# Patient Record
Sex: MALE | Race: WHITE | Employment: PART TIME | URBAN - NONMETROPOLITAN AREA
[De-identification: names, ages, dates, MRNs, and addresses within clinical notes are randomized per-mention and may not be internally consistent; named-entity substitution may affect disease eponyms.]

---

## 2018-07-11 ENCOUNTER — APPOINTMENT (OUTPATIENT)
Dept: GENERAL RADIOLOGY | Age: 23
End: 2018-07-11
Payer: COMMERCIAL

## 2018-07-11 ENCOUNTER — HOSPITAL ENCOUNTER (EMERGENCY)
Age: 23
Discharge: HOME | End: 2018-07-12
Payer: COMMERCIAL

## 2018-07-11 VITALS
HEIGHT: 74 IN | DIASTOLIC BLOOD PRESSURE: 76 MMHG | OXYGEN SATURATION: 99 % | SYSTOLIC BLOOD PRESSURE: 114 MMHG | RESPIRATION RATE: 16 BRPM | BODY MASS INDEX: 26.95 KG/M2 | HEART RATE: 57 BPM | TEMPERATURE: 98.3 F | WEIGHT: 210 LBS

## 2018-07-11 DIAGNOSIS — S62.640B OPEN NONDISPLACED FRACTURE OF PROXIMAL PHALANX OF RIGHT INDEX FINGER, INITIAL ENCOUNTER: ICD-10-CM

## 2018-07-11 DIAGNOSIS — S61.210A LACERATION OF RIGHT INDEX FINGER WITHOUT FOREIGN BODY WITHOUT DAMAGE TO NAIL, INITIAL ENCOUNTER: Primary | ICD-10-CM

## 2018-07-11 PROCEDURE — 6370000000 HC RX 637 (ALT 250 FOR IP): Performed by: STUDENT IN AN ORGANIZED HEALTH CARE EDUCATION/TRAINING PROGRAM

## 2018-07-11 PROCEDURE — 12001 RPR S/N/AX/GEN/TRNK 2.5CM/<: CPT

## 2018-07-11 PROCEDURE — 99282 EMERGENCY DEPT VISIT SF MDM: CPT

## 2018-07-11 PROCEDURE — 73140 X-RAY EXAM OF FINGER(S): CPT

## 2018-07-11 RX ORDER — HYDROCODONE BITARTRATE AND ACETAMINOPHEN 5; 325 MG/1; MG/1
1 TABLET ORAL ONCE
Status: COMPLETED | OUTPATIENT
Start: 2018-07-11 | End: 2018-07-11

## 2018-07-11 RX ADMIN — HYDROCODONE BITARTRATE AND ACETAMINOPHEN 1 TABLET: 5; 325 TABLET ORAL at 23:19

## 2018-07-11 ASSESSMENT — PAIN SCALES - GENERAL
PAINLEVEL_OUTOF10: 7
PAINLEVEL_OUTOF10: 7

## 2018-07-12 PROCEDURE — 6370000000 HC RX 637 (ALT 250 FOR IP): Performed by: EMERGENCY MEDICINE

## 2018-07-12 RX ORDER — CEPHALEXIN 500 MG/1
500 CAPSULE ORAL 4 TIMES DAILY
Qty: 28 CAPSULE | Refills: 0 | Status: SHIPPED | OUTPATIENT
Start: 2018-07-12 | End: 2018-07-19

## 2018-07-12 RX ORDER — CEPHALEXIN 250 MG/1
500 CAPSULE ORAL ONCE
Status: COMPLETED | OUTPATIENT
Start: 2018-07-12 | End: 2018-07-12

## 2018-07-12 RX ORDER — CLINDAMYCIN PHOSPHATE 150 MG/ML
600 INJECTION, SOLUTION INTRAVENOUS ONCE
Status: DISCONTINUED | OUTPATIENT
Start: 2018-07-12 | End: 2018-07-12

## 2018-07-12 RX ADMIN — CEPHALEXIN 500 MG: 250 CAPSULE ORAL at 00:46

## 2018-07-12 ASSESSMENT — ENCOUNTER SYMPTOMS
EYE DISCHARGE: 0
NAUSEA: 0
BACK PAIN: 0
EYE REDNESS: 0
ABDOMINAL PAIN: 0
COUGH: 0
DIARRHEA: 0
VOMITING: 0
RHINORRHEA: 0
SORE THROAT: 0
SHORTNESS OF BREATH: 0
WHEEZING: 0

## 2018-07-12 NOTE — ED PROVIDER NOTES
CONSULTS:  None    PROCEDURES:  Lac Repair  Date/Time: 7/12/2018 12:07 AM  Performed by: Antoni Lynch  Authorized by: Antoni Lynch     Consent:     Consent obtained:  Verbal    Consent given by:  Patient    Risks discussed:  Infection, pain, poor cosmetic result, tendon damage, vascular damage, need for additional repair and nerve damage  Anesthesia (see MAR for exact dosages): Anesthesia method:  Nerve block    Block needle gauge:  27 G    Block anesthetic:  Lidocaine 1% w/o epi    Block injection procedure:  Anatomic landmarks palpated, incremental injection, negative aspiration for blood and anatomic landmarks identified    Block outcome:  Anesthesia achieved  Laceration details:     Location:  Finger    Finger location:  R index finger    Length (cm):  2    Depth (mm):  4  Pre-procedure details:     Preparation:  Patient was prepped and draped in usual sterile fashion and imaging obtained to evaluate for foreign bodies  Exploration:     Hemostasis achieved with:  Direct pressure and tourniquet    Wound extent: no nerve damage noted and no underlying fracture noted      Contaminated: yes    Treatment:     Area cleansed with:  Shsarbjit-Clens    Amount of cleaning:  Standard    Irrigation solution:  Sterile saline    Irrigation volume:  100 mL  Skin repair:     Repair method:  Sutures    Suture size:  5-0    Suture material:  Nylon    Suture technique: 2 vertical mattress, 1 simple interrupted. Approximation:     Approximation:  Close    Vermilion border: well-aligned    Post-procedure details:     Dressing:  Splint for protection and non-adherent dressing    Patient tolerance of procedure: Tolerated well, no immediate complications          FINAL IMPRESSION      1. Laceration of right index finger without foreign body without damage to nail, initial encounter    2.  Open nondisplaced fracture of proximal phalanx of right index finger, initial encounter          DISPOSITION/PLAN

## 2018-07-20 ENCOUNTER — HOSPITAL ENCOUNTER (OUTPATIENT)
Dept: OCCUPATIONAL THERAPY | Age: 23
Setting detail: THERAPIES SERIES
Discharge: HOME | End: 2018-07-20
Payer: COMMERCIAL

## 2018-07-20 PROCEDURE — 97165 OT EVAL LOW COMPLEX 30 MIN: CPT

## 2018-07-20 PROCEDURE — 97110 THERAPEUTIC EXERCISES: CPT

## 2018-07-20 PROCEDURE — 97597 DBRDMT OPN WND 1ST 20 CM/<: CPT

## 2018-07-20 NOTE — PROGRESS NOTES
allergies and medications. General:  OT Visit Information  Onset Date: 07/20/18  OT Insurance Information: Medical [place] of [address]; modalities covered; no visit limit  Total # of Visits to Date: 1  Certification Period Expiration Date: 09/14/18  Progress Note Counter: 1/10 for PN  Comments: returns to referring provider on 7/30  Chart Reviewed: Yes    Restrictions/Precautions:       Position Activity Restriction  Other position/activity restrictions: Follow [place] Hand Protocol - pg 93; surgery on 7/16         Subjective:  Subjective: Cooperative with evaluation. 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: [employer]    Objective  Vision: Within Functional Limits  Hearing: Within functional limits                                                         Right Hand PROM (degrees)  Right Hand PROM: Exceptions  R Index  MCP 0-90: 70-85  R Index PIP 0-100: 70  R Index DIP 0-70: 45, TPM = 130                                                                                                   ADL  Additional Comments: Patient reports having difficulty with any bimanual activities such as washing hair and brushing teeth. Patient not able to cut or peel food. Patient relates that he is able to drive without difficulty and is having no difficulty with sleeping. Having severe difficulty with buttons and tying shoes. Activity Tolerance:   Additional Comments: Tolerated evaluation and treatment well     Assessment:  Performance deficits / Impairments: Decreased ADL status, Decreased ROM, Decreased strength, Decreased high-level IADLs  Assessment: Patient meets criteria for low complexity evaluation based on documented evaluation findings. Prognosis: Good  Clinical Decision Making: Clinical Decision making was of Low Complexity as the result of analysis of data from a problem focused assessment, a consideration of a limited number of treatment options, no significant comorbidities affecting the plan of care and no modification or assistance required to complete the evaluation. Patient Education:  Patient Education: OT POC, HEP - PROM to R2 DIP, PIP, and composite flexion within restriction of splint; coban wrapping, dressing change  Barriers to Learning: none          Treatment Initiated : Cleaned incision with alcohol, redressed with adaptice and stretch gauze. Applied coban on R2 for mild edema present. PROM completed to R2 within limitations of dorsal blocking splint    Plan:  Times per week: 2 x week  Plan weeks: 8 weeks  Current Treatment Recommendations: Strengthening, ROM, Equipment Evaluation, Education, & procurement, Patient/Caregiver Education & Training, Other (comment) (splinting)  Plan Comment: POC established and discussed with patient  Specific instructions for Next Treatment: Flexor Tendon Repair Protocol - pg. 93    Goals:  Patient goals : To use my hand as normally as possible. Short term goals  Time Frame for Short term goals: 4 weeks  Short term goal 1: Be independent with HEP as instructed to increase ability to use right hand in school activities. Short term goal 2: Increase TPM of R2 to 150 to increase his ability to eventually hold screwdriver in right hand. Short term goal 3: Report pain going no higher than 2/10 in right hand at any time to increase ability to perform school related activities.   Long term goals  Time Frame for Long term

## 2018-07-25 ENCOUNTER — HOSPITAL ENCOUNTER (OUTPATIENT)
Dept: OCCUPATIONAL THERAPY | Age: 23
Setting detail: THERAPIES SERIES
Discharge: HOME | End: 2018-07-25
Payer: COMMERCIAL

## 2018-07-25 PROCEDURE — 97110 THERAPEUTIC EXERCISES: CPT

## 2018-07-25 NOTE — PROGRESS NOTES
[facility]  OUTPATIENT OCCUPATIONAL THERAPY  Daily Note  [facility]    Time In: 5302  Time Out: 0830  Minutes: 25  Timed Code Treatment Minutes: 25 Minutes     Date: 2018  Patient Name: Jane Fine        CSN: [id number]   : 1995  (26 y.o.)  Gender: male   Referring Practitioner: Sunni Caldwell PA-C  Diagnosis: S69.91XA R2 zone 2 FDS/FDP laceration          General:  OT Visit Information  Onset Date: 18  OT Insurance Information: [employer] of [address] [place] [address]; modalities covered; no visit limit  Total # of Visits to Date: 2  Certification Period Expiration Date: 18  Progress Note Counter: 2/10 for PN  Comments: returns to referring provider on        Restrictions/Precautions:       Position Activity Restriction  Other position/activity restrictions: Follow [place] Hand Protocol - pg 93; surgery on          Subjective:  Subjective: States that his fingers do get stiff. States that he thinks that some of the swelling has gone down in the right index finger. Pain:  Patient Currently in Pain: Denies       Objective:     Upper Extremity Function  UE PROM: PROM to R2 MP, PIP, and DIP within limitation of splint - slightly improved ROM this date compared to evaluation                             Additional Activities Comment  Additional Activities: Removed dressing - no drainage. Cleaned area with rubbing alcohol. Redressed with stretch gauze and coban wrap for edema management. Activity Tolerance: Additional Comments:  Tolerated treatment well    Assessment:  Assessment: Progressing toward goals    Patient Education:     No new patient education completed this date          Plan:  Plan Comment: Continue per established POC  Specific instructions for Next Treatment: Flexor Tendon Repair Protocol - pg. 80                      Jaime Lala, OTR/L #[id number]

## 2018-07-27 ENCOUNTER — HOSPITAL ENCOUNTER (OUTPATIENT)
Dept: OCCUPATIONAL THERAPY | Age: 23
Setting detail: THERAPIES SERIES
Discharge: HOME | End: 2018-07-27
Payer: COMMERCIAL

## 2018-07-27 PROCEDURE — 97110 THERAPEUTIC EXERCISES: CPT

## 2018-08-01 ENCOUNTER — HOSPITAL ENCOUNTER (OUTPATIENT)
Dept: OCCUPATIONAL THERAPY | Age: 23
Setting detail: THERAPIES SERIES
Discharge: HOME | End: 2018-08-01
Payer: COMMERCIAL

## 2018-08-01 PROCEDURE — 97110 THERAPEUTIC EXERCISES: CPT

## 2018-08-01 PROCEDURE — 97597 DBRDMT OPN WND 1ST 20 CM/<: CPT

## 2018-08-03 ENCOUNTER — HOSPITAL ENCOUNTER (OUTPATIENT)
Dept: OCCUPATIONAL THERAPY | Age: 23
Setting detail: THERAPIES SERIES
Discharge: HOME | End: 2018-08-03
Payer: COMMERCIAL

## 2018-08-03 PROCEDURE — 97110 THERAPEUTIC EXERCISES: CPT

## 2018-08-08 ENCOUNTER — HOSPITAL ENCOUNTER (OUTPATIENT)
Dept: OCCUPATIONAL THERAPY | Age: 23
Setting detail: THERAPIES SERIES
Discharge: HOME | End: 2018-08-08
Payer: COMMERCIAL

## 2018-08-08 PROCEDURE — 97110 THERAPEUTIC EXERCISES: CPT

## 2018-08-15 ENCOUNTER — HOSPITAL ENCOUNTER (OUTPATIENT)
Dept: OCCUPATIONAL THERAPY | Age: 23
Setting detail: THERAPIES SERIES
Discharge: HOME | End: 2018-08-15
Payer: COMMERCIAL

## 2018-08-15 PROCEDURE — 97110 THERAPEUTIC EXERCISES: CPT

## 2018-08-17 ENCOUNTER — HOSPITAL ENCOUNTER (OUTPATIENT)
Dept: OCCUPATIONAL THERAPY | Age: 23
Setting detail: THERAPIES SERIES
Discharge: HOME | End: 2018-08-17
Payer: COMMERCIAL

## 2018-08-17 PROCEDURE — 97110 THERAPEUTIC EXERCISES: CPT

## 2018-08-17 NOTE — PROGRESS NOTES
[facility]  OUTPATIENT OCCUPATIONAL THERAPY  Daily Note  [place]    Time In: 7045  Time Out: [address]  Minutes: 30  Timed Code Treatment Minutes: 30 Minutes     Date: 2018  Patient Name: Ciera Cates        CSN: [id number]   : 1995  (26 y.o.)  Gender: male   Referring Practitioner: Felicia Higgins PA-C  Diagnosis: S69.91XA R2 zone 2 FDS/FDP laceration          General:  OT Visit Information  Onset Date: 18  OT Insurance Information: Medical [place] of [address] [place] [address]; modalities covered; no visit limit  Total # of Visits to Date: 8  Certification Period Expiration Date: 18  Progress Note Counter: 8/10 for PN  Comments: returns to referring provider on        Restrictions/Precautions:       Position Activity Restriction  Other position/activity restrictions: Follow [place] Hand Protocol - pg 93; surgery on          Subjective:  Subjective: States that things are going \"good. \"          Pain:  Patient Currently in Pain: Denies       Objective:     Upper Extremity Function  UE AROM: active flexion within limitations of splint - demonstrated only fair PIP pull through and poor DIP pull through this date; Active R2 PIP flexion did improve when doing PIP flexion for all fingers on right hand at same time; Place and holds for R2 PIP flexion x 5 reps; active R2 PIP flexion improved slightly at end of session  UE PROM: PROM to R2 MP, PIP, and DIP joints within limitations of splint  UE Stretching: scar massage to palmar surface of proximal R2 and distal end of second metacarpal; soft tissue mobilization at this same area to assist with scar remodeling                             Additional Activities Comment  Additional Activities: Wrapped R2 with coban for edema management                  Activity Tolerance: Additional Comments:  Tolerated treatment well    Assessment:  Assessment: Progressing toward goals    Patient Education:   No new patient education

## 2018-08-22 ENCOUNTER — APPOINTMENT (OUTPATIENT)
Dept: OCCUPATIONAL THERAPY | Age: 23
End: 2018-08-22
Payer: COMMERCIAL

## 2018-08-22 ENCOUNTER — HOSPITAL ENCOUNTER (OUTPATIENT)
Dept: OCCUPATIONAL THERAPY | Age: 23
Setting detail: THERAPIES SERIES
Discharge: HOME | End: 2018-08-22
Payer: COMMERCIAL

## 2018-08-22 PROCEDURE — 97110 THERAPEUTIC EXERCISES: CPT

## 2018-08-24 ENCOUNTER — HOSPITAL ENCOUNTER (OUTPATIENT)
Dept: OCCUPATIONAL THERAPY | Age: 23
Setting detail: THERAPIES SERIES
Discharge: HOME | End: 2018-08-24
Payer: COMMERCIAL

## 2018-08-24 PROCEDURE — 97110 THERAPEUTIC EXERCISES: CPT

## 2018-08-24 NOTE — FLOWSHEET NOTE
within 1/4\" to touching R2 tip to palm;   UE Stretching: scar massage to palmar surface of proximal R2                                                Activity Tolerance: Additional Comments: Tolerated treatment well    Assessment:  Assessment: Patient has made appropriate gains toward goals while in therapy. Patient does not have any active pull through at R2 DIP joint, but passive R2 DIP is 40 degrees. Slight extension lag present at PIP joint, but patient has been instructed to work on passive extension at R2 PIP joint. Further therapy is required to address AROM, PROM, and eventual functional strength/use of right hand. Patient Education:  Patient Education: goal status; to increase intensity of HEP            Plan:  Times per week: 2 x week  Plan weeks: 5 weeks  Plan Comment: POC updated and discussed with patient  Specific instructions for Next Treatment: Flexor Tendon Repair Protocol - pg. 93    Patient goals : To use my hand as normally as possible. Short term goals  Time Frame for Short term goals: 4 weeks  Short term goal 1: Be independent with HEP as instructed to increase ability to use right hand in school activities. GOAL MET - CONTINUE WITH HEP UPGRADES  Short term goal 2: Increase TPM of R2 to 150 to increase his ability to eventually hold screwdriver in right hand. GOAL NOT MET- SEE OBJECTIVE SECTION OF NOTE - REVISED GOAL; Increase TPM of R2 to 200 to increase ability to eventually use tools in right hand. Short term goal 3: Report pain going no higher than 2/10 in right hand at any time to increase ability to perform school related activities. GOAL MET - REPORTS HAVING NO PAIN - REVISED GOAL; Increase SHARP of R2 to 140 to increase his abiltiy to hold onto pen with right hand. Long term goals  Time Frame for Long term goals : 5 weeks  Long term goal 1: Be able to use right hand to use screwdriver without difficulty.  GOAL NOT MET - NOT YET ALLOWED TO DO THIS TASK - CONTINUE GOAL  Long term goal 2: Be able to use right hand to turn steering wheel without difficulty. GOAL NOT MET - NOT YET ALLOWED TO DO THIS TASK - CONTINUE GOAL  Long term goal 3: Be able to use right hand to write name legibly without difficulty.  GOAL NOT MET - CONTINUE GOAL            Julieta Orozco, OTR/L #[id number]

## 2018-08-29 ENCOUNTER — HOSPITAL ENCOUNTER (OUTPATIENT)
Dept: OCCUPATIONAL THERAPY | Age: 23
Setting detail: THERAPIES SERIES
Discharge: HOME | End: 2018-08-29
Payer: COMMERCIAL

## 2018-08-29 PROCEDURE — 97035 APP MDLTY 1+ULTRASOUND EA 15: CPT

## 2018-08-29 PROCEDURE — 97110 THERAPEUTIC EXERCISES: CPT

## 2018-08-31 ENCOUNTER — HOSPITAL ENCOUNTER (OUTPATIENT)
Dept: OCCUPATIONAL THERAPY | Age: 23
Setting detail: THERAPIES SERIES
Discharge: HOME | End: 2018-08-31
Payer: COMMERCIAL

## 2018-08-31 PROCEDURE — 97110 THERAPEUTIC EXERCISES: CPT

## 2018-08-31 PROCEDURE — 97035 APP MDLTY 1+ULTRASOUND EA 15: CPT

## 2018-08-31 NOTE — PROGRESS NOTES
tightness of tissue of R2.      Plan:  Current Treatment Recommendations: Strengthening, ROM, Equipment Evaluation, Education, & procurement, Patient/Caregiver Education & Training, Other (comment)  Plan Comment: Continue per established POC  Specific instructions for Next Treatment: Flexor Tendon Repair Protocol - pg. 93; Ultrasound       Prudencio Kaur, MOT, OTR/L [id number]

## 2018-09-05 ENCOUNTER — HOSPITAL ENCOUNTER (OUTPATIENT)
Dept: OCCUPATIONAL THERAPY | Age: 23
Setting detail: THERAPIES SERIES
Discharge: HOME | End: 2018-09-05
Payer: COMMERCIAL

## 2018-09-05 PROCEDURE — 97035 APP MDLTY 1+ULTRASOUND EA 15: CPT

## 2018-09-05 PROCEDURE — 97110 THERAPEUTIC EXERCISES: CPT

## 2018-09-05 NOTE — PROGRESS NOTES
POC  Specific instructions for Next Treatment: Flexor Tendon Repair Protocol - pg. 93; Ultrasound                      Maco Chang, OTR/L #[id number]

## 2018-09-07 ENCOUNTER — HOSPITAL ENCOUNTER (OUTPATIENT)
Dept: OCCUPATIONAL THERAPY | Age: 23
Setting detail: THERAPIES SERIES
Discharge: HOME | End: 2018-09-07
Payer: COMMERCIAL

## 2018-09-07 PROCEDURE — 97035 APP MDLTY 1+ULTRASOUND EA 15: CPT

## 2018-09-07 PROCEDURE — 97110 THERAPEUTIC EXERCISES: CPT

## 2018-09-07 NOTE — PROGRESS NOTES
[address] [address]  OUTPATIENT OCCUPATIONAL THERAPY  Daily Note  [facility]    Time In: 08  Time Out: 0900  Minutes: 30  Timed Code Treatment Minutes: 30 Minutes     Date: 2018  Patient Name: Remedios Palomo        CSN: [id number]   : 1995  (26 y.o.)  Gender: male   Referring Practitioner: Carolyn Garza PA-C  Diagnosis: S69.91XA R2 zone 2 FDS/FDP laceration          General:  OT Visit Information  Onset Date: 18  OT Insurance Information: Medical [place] of [address] [place] [address]; modalities covered; no visit limit  Total # of Visits to Date: 14  Certification Period Expiration Date: 18  Progress Note Counter: PN completed on ; 4/10 for PN  Comments: returns to referring provider on 10/16       Restrictions/Precautions:       Position Activity Restriction  Other position/activity restrictions: Follow [place] Hand Protocol - pg 93; surgery on          Subjective:  Subjective: States that he continues to use his right index finger as much as he can. Pain:  Patient Currently in Pain: Denies       Objective:     Upper Extremity Function  UE AROM: active R2 PIP motion at end of session: 15-65; no active R2 DIP motion, but patient relates that he can feel that the tendon is trying to move - states that this feeling is different than it was last week. UE PROM: PROM completed to R2 - worked on both isolated and composite flexion  UE Stretching: aggressive scar massage to volar surface of R2 and into palm                                     Ultrasound  Ultrasound Location: volar surface of R2  Ultrasound Parameters: 50% pulsed, 3.3 mHz, 0.8 w/cm2 for 8 minutes using ultrasound gel completed for scar management and edema          Activity Tolerance: Additional Comments:  Tolerated treatment well    Assessment:  Assessment: Progressing toward goals    Patient Education:   No new patient education completed this date            Plan:  Plan Comment: Continue per established POC  Specific instructions for Next Treatment: Flexor Tendon Repair Protocol - pg. 93; Ultrasound                      Bhavin Enrique, OTR/L #[id number]

## 2018-09-10 ENCOUNTER — OFFICE VISIT (OUTPATIENT)
Dept: FAMILY MEDICINE CLINIC | Age: 23
End: 2018-09-10
Payer: COMMERCIAL

## 2018-09-10 VITALS
DIASTOLIC BLOOD PRESSURE: 70 MMHG | SYSTOLIC BLOOD PRESSURE: 118 MMHG | WEIGHT: 211 LBS | BODY MASS INDEX: 27.09 KG/M2 | HEART RATE: 80 BPM

## 2018-09-10 DIAGNOSIS — L03.119 CELLULITIS OF FOOT: Primary | ICD-10-CM

## 2018-09-10 PROCEDURE — G8427 DOCREV CUR MEDS BY ELIG CLIN: HCPCS | Performed by: NURSE PRACTITIONER

## 2018-09-10 PROCEDURE — 99213 OFFICE O/P EST LOW 20 MIN: CPT | Performed by: NURSE PRACTITIONER

## 2018-09-10 PROCEDURE — G8419 CALC BMI OUT NRM PARAM NOF/U: HCPCS | Performed by: NURSE PRACTITIONER

## 2018-09-10 PROCEDURE — 1036F TOBACCO NON-USER: CPT | Performed by: NURSE PRACTITIONER

## 2018-09-10 RX ORDER — CEPHALEXIN 500 MG/1
500 CAPSULE ORAL 3 TIMES DAILY
Qty: 30 CAPSULE | Refills: 0 | Status: SHIPPED | OUTPATIENT
Start: 2018-09-10 | End: 2018-09-20

## 2018-09-10 RX ORDER — MUPIROCIN CALCIUM 20 MG/G
CREAM TOPICAL
Qty: 15 G | Refills: 0 | Status: SHIPPED | OUTPATIENT
Start: 2018-09-10 | End: 2018-10-10

## 2018-09-10 RX ORDER — SULFAMETHOXAZOLE AND TRIMETHOPRIM 800; 160 MG/1; MG/1
1 TABLET ORAL 2 TIMES DAILY
Qty: 20 TABLET | Refills: 0 | Status: SHIPPED | OUTPATIENT
Start: 2018-09-10 | End: 2018-09-20

## 2018-09-10 ASSESSMENT — ENCOUNTER SYMPTOMS
COLOR CHANGE: 1
RESPIRATORY NEGATIVE: 1
EYES NEGATIVE: 1
GASTROINTESTINAL NEGATIVE: 1

## 2018-09-10 ASSESSMENT — PATIENT HEALTH QUESTIONNAIRE - PHQ9
1. LITTLE INTEREST OR PLEASURE IN DOING THINGS: 0
SUM OF ALL RESPONSES TO PHQ9 QUESTIONS 1 & 2: 0
2. FEELING DOWN, DEPRESSED OR HOPELESS: 0
SUM OF ALL RESPONSES TO PHQ QUESTIONS 1-9: 0
SUM OF ALL RESPONSES TO PHQ QUESTIONS 1-9: 0

## 2018-09-10 NOTE — PROGRESS NOTES
Rex Rodriguez is a 26 y.o. male who presents today for :  Chief Complaint   Patient presents with   [facility] CARLOS     possible poison ivy on left foot     Edema     swelling, redness in left foot, painfull to walk. HPI:     HPI  [place] Started with blisters and thought had poison ivy but foot began to turn red and blisters began to drain yellow to milk puss. Foot very sore yesterday. Feels a little better today, has no systemic symptoms  There is no problem list on file for this patient. History reviewed. No pertinent past medical history. Past Surgical History:   Procedure Laterality Date    TYMPANOSTOMY TUBE PLACEMENT Bilateral      Family History   Problem Relation Age of Onset    Other Paternal Grandmother     Cancer Maternal Grandfather      Social History   Substance Use Topics    Smoking status: Never Smoker    Smokeless tobacco: Never Used    Alcohol use No      Current Outpatient Prescriptions   Medication Sig Dispense Refill    cephALEXin (KEFLEX) 500 MG capsule Take 1 capsule by mouth 3 times daily for 10 days 30 capsule 0    sulfamethoxazole-trimethoprim (BACTRIM DS) 800-160 MG per tablet Take 1 tablet by mouth 2 times daily for 10 days 20 tablet 0    mupirocin (BACTROBAN) 2 % cream Apply 3 times daily. 15 g 0     No current facility-administered medications for this visit. No Known Allergies  Health Maintenance   Topic Date Due    HIV screen  05/30/2012    Meningococcal (MCV) Vaccine Age 0-22 Years (1 of 1) 05/30/2013    DTaP/Tdap/Td vaccine (1 - Tdap) 05/30/2016    Flu vaccine (1) 09/01/2018       Subjective:      Review of Systems   Constitutional: Negative. HENT: Negative. Eyes: Negative. Respiratory: Negative. Cardiovascular: Negative. Gastrointestinal: Negative. Musculoskeletal: Negative. Skin: Positive for color change and wound. Neurological: Negative.         Objective:     Vitals:    09/10/18 1144   BP: 118/70   Site: Left Upper Arm   Position: Sitting   Cuff Size: Medium Adult   Pulse: 80   Weight: 211 lb (95.7 kg)       Physical Exam   Constitutional: He is oriented to person, place, and time. He appears well-developed and well-nourished. HENT:   Head: Normocephalic. Right Ear: Tympanic membrane and external ear normal.   Left Ear: Tympanic membrane and external ear normal.   Nose: Nose normal.   Mouth/Throat: Oropharynx is clear and moist.   Neck: Normal range of motion. Neck supple. Cardiovascular: Normal rate, regular rhythm, normal heart sounds and intact distal pulses. Exam reveals no gallop and no friction rub. No murmur heard. Pulmonary/Chest: Effort normal and breath sounds normal. He has no wheezes. He has no rales. Abdominal: Soft. Bowel sounds are normal. There is no tenderness. There is no guarding. Musculoskeletal: Normal range of motion. Lymphadenopathy:     He has no cervical adenopathy. Neurological: He is alert and oriented to person, place, and time. He has normal reflexes. Skin: Skin is warm. Psychiatric: He has a normal mood and affect. Pt with mult puss filled blisters did rupture one and cultured. Red tender shiny skin 1st-3rd toes extending to mid foot  Assessment:      Diagnosis Orders   1. Cellulitis of foot  cephALEXin (KEFLEX) 500 MG capsule    sulfamethoxazole-trimethoprim (BACTRIM DS) 800-160 MG per tablet    mupirocin (BACTROBAN) 2 % cream    Culture, Generic       Plan:      Return in about 2 days (around 9/12/2018).     Orders Placed This Encounter   Procedures    Culture, Generic     Order Specific Question:   Specify Specimen Type     Answer:   left foot     Orders Placed This Encounter   Medications    cephALEXin (KEFLEX) 500 MG capsule     Sig: Take 1 capsule by mouth 3 times daily for 10 days     Dispense:  30 capsule     Refill:  0    sulfamethoxazole-trimethoprim (BACTRIM DS) 800-160 MG per tablet     Sig: Take 1 tablet by mouth 2 times

## 2018-09-11 LAB
AEROBIC CULTURE: ABNORMAL
AEROBIC CULTURE: ABNORMAL
GRAM STAIN RESULT: ABNORMAL
ORGANISM: ABNORMAL

## 2018-09-12 ENCOUNTER — HOSPITAL ENCOUNTER (OUTPATIENT)
Dept: OCCUPATIONAL THERAPY | Age: 23
Setting detail: THERAPIES SERIES
Discharge: HOME | End: 2018-09-12
Payer: COMMERCIAL

## 2018-09-12 ENCOUNTER — OFFICE VISIT (OUTPATIENT)
Dept: FAMILY MEDICINE CLINIC | Age: 23
End: 2018-09-12
Payer: COMMERCIAL

## 2018-09-12 VITALS
DIASTOLIC BLOOD PRESSURE: 70 MMHG | HEART RATE: 80 BPM | BODY MASS INDEX: 27.09 KG/M2 | TEMPERATURE: 98.1 F | WEIGHT: 211 LBS | SYSTOLIC BLOOD PRESSURE: 116 MMHG

## 2018-09-12 DIAGNOSIS — L03.119 CELLULITIS OF FOOT: ICD-10-CM

## 2018-09-12 DIAGNOSIS — A49.02 MRSA INFECTION: Primary | ICD-10-CM

## 2018-09-12 PROCEDURE — G8419 CALC BMI OUT NRM PARAM NOF/U: HCPCS | Performed by: NURSE PRACTITIONER

## 2018-09-12 PROCEDURE — 99213 OFFICE O/P EST LOW 20 MIN: CPT | Performed by: NURSE PRACTITIONER

## 2018-09-12 PROCEDURE — 97110 THERAPEUTIC EXERCISES: CPT

## 2018-09-12 PROCEDURE — 1036F TOBACCO NON-USER: CPT | Performed by: NURSE PRACTITIONER

## 2018-09-12 PROCEDURE — G8427 DOCREV CUR MEDS BY ELIG CLIN: HCPCS | Performed by: NURSE PRACTITIONER

## 2018-09-12 ASSESSMENT — ENCOUNTER SYMPTOMS
GASTROINTESTINAL NEGATIVE: 1
EYES NEGATIVE: 1
RESPIRATORY NEGATIVE: 1

## 2018-09-12 NOTE — PROGRESS NOTES
[address] [address]  OUTPATIENT OCCUPATIONAL THERAPY  Daily Note  [facility]    Time In: 0800  Time Out: 0830  Minutes: 30  Timed Code Treatment Minutes: 30 Minutes     Date: 2018  Patient Name: Livan Casey        CSN: [id number]   : 1995  (26 y.o.)  Gender: male   Referring Practitioner: Lucas Montiel PA-C  Diagnosis: S69.91XA R2 zone 2 FDS/FDP laceration          General:  OT Visit Information  Onset Date: 18  OT Insurance Information: [employer] of [address] [place] [address]; modalities covered; no visit limit  Total # of Visits to Date: 15  Certification Period Expiration Date: 18  Progress Note Counter: PN completed on ; 5/10 for PN  Comments: returns to referring provider on 10/16       Restrictions/Precautions:       Position Activity Restriction  Other position/activity restrictions: Follow [place] Hand Protocol - pg 93; surgery on          Subjective:  Subjective: States that his finger is \"good. \" States that he thinks that the swelling is going down more in his index finger. Pain:  Patient Currently in Pain: Denies       Objective:     Upper Extremity Function  UE AROM: active R2 PIP flexion to tolerance; active right fisting x 10 reps; poor pull through of R2 DIP flexion continues; place and holds with R2 - successful at PIP, but not at DIP  UE PROM: PROM completed to R2 for PIP and DIP flexion  UE Strengthing: ergogripper wtih 4 blue bands x 15 reps with right UE; soft theraputty gripping x 15 reps with right UE; soft theraputty - pinching with right thumb and index x 15 reps                                                Activity Tolerance: Additional Comments:  Tolerated treatment well    Assessment:  Assessment: Progressing toward goals    Patient Education:     No new patient education completed this date          Plan:  Plan Comment: Continue per established POC  Specific instructions for Next Treatment: Flexor Tendon Repair Protocol

## 2018-09-14 ENCOUNTER — HOSPITAL ENCOUNTER (OUTPATIENT)
Dept: OCCUPATIONAL THERAPY | Age: 23
Setting detail: THERAPIES SERIES
Discharge: HOME | End: 2018-09-14
Payer: COMMERCIAL

## 2018-09-14 PROCEDURE — 97110 THERAPEUTIC EXERCISES: CPT

## 2018-09-19 ENCOUNTER — HOSPITAL ENCOUNTER (OUTPATIENT)
Dept: OCCUPATIONAL THERAPY | Age: 23
Setting detail: THERAPIES SERIES
Discharge: HOME | End: 2018-09-19
Payer: COMMERCIAL

## 2018-09-19 PROCEDURE — 97110 THERAPEUTIC EXERCISES: CPT

## 2018-09-19 PROCEDURE — 97035 APP MDLTY 1+ULTRASOUND EA 15: CPT

## 2018-09-19 NOTE — PROGRESS NOTES
[address] [address]  OUTPATIENT OCCUPATIONAL THERAPY  Daily Note  [facility]    Time In: 0800  Time Out: 0830  Minutes: 30  Timed Code Treatment Minutes: 30 Minutes     Date: 2018  Patient Name: Aislinn Sotelo        CSN: [id number]   : 1995  (26 y.o.)  Gender: male   Referring Practitioner: Kenyetta Emerson PA-C  Diagnosis: S69.91XA R2 zone 2 FDS/FDP laceration          General:  OT Visit Information  Onset Date: 18  OT Insurance Information: [employer] [place] of [address] [place] [address]; modalities covered; no visit limit  Total # of Visits to Date: 16  Certification Period Expiration Date: 18  Progress Note Counter: PN completed on ; 7/10 for PN  Comments: returns to referring provider on 10/16       Restrictions/Precautions:       Position Activity Restriction  Other position/activity restrictions: Follow [place] Hand Protocol - pg 93; surgery on          Subjective:  Subjective: States that he hasn't any increased motion in his right index finger. Pain:  Patient Currently in Pain: Denies       Objective:     Upper Extremity Function  UE AROM: active R2 MP flexion and extension while PIP and DIP taped into flexion to work on tendon gliding of FDP and FDS as well as stretching of extensor tendons - states that his finger felt like it \"was loosening up\" after this activity; joint blocking with R2 DIP - slight movement present at DIP this day and patient reported that it felt a little different  UE PROM: PROM completed to R2 for PIP and DIP flexion                                     Ultrasound  Ultrasound Location: volar surface of R2  Ultrasound Parameters: 50% pulsed, 3.3 mHz, 0.8 w/cm2 for 8 minutes using ultrasound gel completed for scar management and edema          Activity Tolerance: Additional Comments:  Tolerated treatment well    Assessment:  Assessment: Progressing toward goals    Patient Education:  Patient Education: to tape R2 into PIP and DIP flexion while actively bending MP joint            Plan:  Plan Comment: Continue per established POC  Specific instructions for Next Treatment: Flexor Tendon Repair Protocol - pg. 93; Ultrasound                      Abimael Mejía, OTR/L #[id number]

## 2018-09-21 ENCOUNTER — HOSPITAL ENCOUNTER (OUTPATIENT)
Dept: OCCUPATIONAL THERAPY | Age: 23
Setting detail: THERAPIES SERIES
Discharge: HOME | End: 2018-09-21
Payer: COMMERCIAL

## 2018-09-21 PROCEDURE — 97110 THERAPEUTIC EXERCISES: CPT

## 2018-09-21 PROCEDURE — 97035 APP MDLTY 1+ULTRASOUND EA 15: CPT

## 2018-09-21 NOTE — PROGRESS NOTES
[address] [address]  OUTPATIENT OCCUPATIONAL THERAPY  Daily Note  [facility]    Time In: 08  Time Out: 0900  Minutes: 30  Timed Code Treatment Minutes: 30 Minutes     Date: 2018  Patient Name: Tomi Stringer        CSN: [id number]   : 1995  (26 y.o.)  Gender: male   Referring Practitioner: Addi Rice PA-C  Diagnosis: S69.91XA R2 zone 2 FDS/FDP laceration          General:  OT Visit Information  Onset Date: 18  OT Insurance Information: Medical [place] of [address] [place] [address]; modalities covered; no visit limit  Total # of Visits to Date: 18  Certification Period Expiration Date: 18  Progress Note Counter: PN completed on ; 8/10 for PN  Comments: returns to referring provider on 10/16       Restrictions/Precautions:       Position Activity Restriction  Other position/activity restrictions: Follow [place] Hand Protocol - pg 93; surgery on          Subjective:  Subjective: States that he wasn't able to tape his finger since last therapy session. Pain:  Patient Currently in Pain: Denies       Objective:     Upper Extremity Function  UE AROM: active R2 MP flexion and extension  UE PROM: PROM completed to R2 for PIP and DIP flexion                                     Ultrasound  Ultrasound Location: volar surface of R2  Ultrasound Parameters: 50% pulsed, 3.3 mHz, 0.8 w/cm2 for 8 minutes using ultrasound gel completed for scar management and edema          Activity Tolerance: Additional Comments: Tolerated treatment well    Assessment:  Assessment: Progressing toward goals.      Patient Education:  Patient Education: to keep coban on R2 for PIP and DIP flexion for approximately 30 minutes and monitor for circulation            Plan:  Plan Comment: Continue per established POC  Specific instructions for Next Treatment: Flexor Tendon Repair Protocol - pg. 93; Ultrasound                      Alexsandra Bradshaw OTR/L #[id number]

## 2018-09-26 ENCOUNTER — HOSPITAL ENCOUNTER (OUTPATIENT)
Dept: OCCUPATIONAL THERAPY | Age: 23
Setting detail: THERAPIES SERIES
Discharge: HOME | End: 2018-09-26
Payer: COMMERCIAL

## 2018-09-26 PROCEDURE — 97110 THERAPEUTIC EXERCISES: CPT

## 2018-09-28 ENCOUNTER — HOSPITAL ENCOUNTER (OUTPATIENT)
Dept: OCCUPATIONAL THERAPY | Age: 23
Setting detail: THERAPIES SERIES
Discharge: HOME | End: 2018-09-28
Payer: COMMERCIAL

## 2018-09-28 PROCEDURE — 97110 THERAPEUTIC EXERCISES: CPT

## 2018-09-28 NOTE — FLOWSHEET NOTE
** PLEASE SIGN, DATE AND TIME CERTIFICATION BELOW AND RETURN TO [facility] OUTPATIENT REHABILITATION (FAX #: [phone]). ATTEST/CO-SIGN IF ACCESSING VIA [employer]. THANK YOU.**    I certify that I have examined the patient below and determined that Physical Medicine and Rehabilitation service is necessary and that I approve the established plan of care for up to 90 days or as specifically noted. Attestation, signature or co-signature of physician indicates approval of certification requirements.    ________________________ ____________ __________  Physician Signature   Date   Time      [address] THERAPY  Progress Note  [facility]    Time In: 0800  Time Out: 0830  Minutes: 30  Timed Code Treatment Minutes: 30 Minutes     Date: 2018  Patient Name: Vita Fermin        CSN: [id number]   : 1995  (26 y.o.)  Gender: male   Referring Practitioner: Dragan Negrete PA-C  Diagnosis: S69.91XA R2 zone 2 FDS/FDP laceration          General:  OT Visit Information  Onset Date: 18  OT Insurance Information: Medical [place] of [address] [place] [address]; modalities covered; no visit limit  Total # of Visits to Date:   Certification Period Expiration Date: 18  Progress Note Counter: PN completed on   Comments: returns to referring provider on 10/16       Restrictions/Precautions:       Position Activity Restriction  Other position/activity restrictions: Follow [place] Hand Protocol - pg 93; surgery on          Subjective:  Subjective: States that overall he feels as though his finger is getting better. States that he is able to hold onto tools easier, but having difficulty with small items such as tightening nuts or picking up small pieces. States that he is not able to use right index finger to pull trigger on impact gun - has to use middle finger. States that he thinks that he will have difficulty shooting gun when hunting. Pain:  Patient Currently in Pain: Denies       Objective:     Upper Extremity Function  UE AROM: active R2 MP = 92, PIP=10-75, DIP=10, EVN=678  UE PROM: passive R2 MP=95, PIP=5-85, DIP=55, DRT=616; PROM to R2 to patient tolerance with emphasis on DIP flexion and PIP extension  UE Strengthing: right  = 70#, left  = 84#, right lateral pinch = 15#, left lateral pinch= 23#, right 3 point pinch= 10#, left 3 point pinch= 16#; gripping medium theraputty with right hand x 20 reps                                                Activity Tolerance: Additional Comments: Tolerated treatment well    Assessment:  Assessment: Patient is making appropriate progress toward goals. Patient's pull through at R2 DIP is improving slowly - currently at 10 degrees. Patient is using right hand in daily and work tasks as able, but is having difficulty using some tools that are necessary for him to complete job. Further therapy is required to address AROM of R2 and strength of right hand. Patient Education:  Patient Education: goal status            Plan:  Times per week: 2 x week   Plan weeks: 6 weeks  Plan Comment: POC updated and discussed with patient  Specific instructions for Next Treatment: US, ROM, strengthening    Patient goals : To use my hand as normally as possible. Short term goals  Time Frame for Short term goals: 4 weeks  Short term goal 1: Be independent with HEP as instructed to increase ability to use right hand in school activities. GOAL MET - CONTINUE WITH HEP UPGRADES  Short term goal 2: Increase TPM of R2 to 200 to increase ability to eventually use tools in right hand. GOAL MET -SEE OBJECTIVE SECTION OF NOTE FOR DETAILS - REVISED GOAL: Increase R2 PIP passive extension to 0 to increase ease of donning glove. Short term goal 3: Increase SHARP of R2 to 140 to increase his abiltiy to hold onto pen with right hand. GOAL MET - SEE OBJECTIVE SECTION OF NOTE FOR DETAILS - REVISED GOAL;  Increase active right

## 2018-10-03 ENCOUNTER — HOSPITAL ENCOUNTER (OUTPATIENT)
Dept: OCCUPATIONAL THERAPY | Age: 23
Setting detail: THERAPIES SERIES
Discharge: HOME | End: 2018-10-03
Payer: COMMERCIAL

## 2018-10-03 PROCEDURE — 97035 APP MDLTY 1+ULTRASOUND EA 15: CPT

## 2018-10-03 PROCEDURE — 97110 THERAPEUTIC EXERCISES: CPT

## 2018-10-05 ENCOUNTER — HOSPITAL ENCOUNTER (OUTPATIENT)
Dept: OCCUPATIONAL THERAPY | Age: 23
Setting detail: THERAPIES SERIES
Discharge: HOME | End: 2018-10-05
Payer: COMMERCIAL

## 2018-10-05 PROCEDURE — G0283 ELEC STIM OTHER THAN WOUND: HCPCS

## 2018-10-05 PROCEDURE — 97110 THERAPEUTIC EXERCISES: CPT

## 2018-10-10 ENCOUNTER — HOSPITAL ENCOUNTER (OUTPATIENT)
Dept: OCCUPATIONAL THERAPY | Age: 23
Setting detail: THERAPIES SERIES
Discharge: HOME | End: 2018-10-10
Payer: COMMERCIAL

## 2018-10-10 PROCEDURE — 97110 THERAPEUTIC EXERCISES: CPT

## 2018-10-10 PROCEDURE — G0283 ELEC STIM OTHER THAN WOUND: HCPCS

## 2018-10-10 NOTE — PROGRESS NOTES
[address] [address]  OUTPATIENT OCCUPATIONAL THERAPY  Daily Note  [place]    Time In: 0800  Time Out: 0830  Minutes: 30  Timed Code Treatment Minutes: 18 Minutes     Date: 10/10/2018  Patient Name: Catia Ware        CSN: [id number]   : 1995  (26 y.o.)  Gender: male   Referring Practitioner: Steffanie Varner PA-C  Diagnosis: S69.91XA R2 zone 2 FDS/FDP laceration          General:  OT Visit Information  Onset Date: 18  OT Insurance Information: Medical [place] of [address] [place] [address]; modalities covered; no visit limit  Total # of Visits to Date: 23  Certification Period Expiration Date: 18  Progress Note Counter: PN completed on ; 3/10 for PN  Comments: returns to referring provider on 10/16       Restrictions/Precautions:       Position Activity Restriction  Other position/activity restrictions: Follow [place] Hand Protocol - pg 93; surgery on          Subjective:  Subjective: States that he thinks that the e-stim helped a little bit. Pain:  Patient Currently in Pain: Denies       Objective:     Upper Extremity Function  UE PROM: PROM to R2 for both isolated and composite flexion  UE Strengthing: R2 pulling against medium theraputty - relates that the finger feels \"tight\" when doing this activity; cap turn putty tool in firm theraputty - able to hold onto top of putty tool without difficulty, but states that it is more difficult for him to hold onto narrow portion of tool due to decreased DIP motion                                     Electrical Stimulation  Electrical Stimulation Location: Right, Hand  Electrical Stimulation Action: R2 flexion - attempting to get R2 DIP flexion, demonstrated R2 MP and PIP flexion and very little observable DIP flexion  Electrical Stimulation Parameters: 10 minutes at 25 intensity          Activity Tolerance: Additional Comments:  Tolerated treatment well    Assessment:  Assessment: Progressing toward goals slowly    Patient Education:     No new patient education completed this date          Plan:  Plan Comment: Continue per established POC  Specific instructions for Next Treatment:  e-stim, ROM, strengthening                      Kari Chase, OTR/L #[id number]

## 2018-10-12 ENCOUNTER — HOSPITAL ENCOUNTER (OUTPATIENT)
Dept: OCCUPATIONAL THERAPY | Age: 23
Setting detail: THERAPIES SERIES
Discharge: HOME | End: 2018-10-12
Payer: COMMERCIAL

## 2018-10-12 PROCEDURE — 97110 THERAPEUTIC EXERCISES: CPT

## 2018-10-12 PROCEDURE — G0283 ELEC STIM OTHER THAN WOUND: HCPCS

## 2018-10-12 NOTE — PROGRESS NOTES
[address] [address]  OUTPATIENT OCCUPATIONAL THERAPY  Daily Note  [facility]    Time In:   Time Out: 0900  Minutes: 25  Timed Code Treatment Minutes: 10 Minutes     Date: 10/12/2018  Patient Name: Panda Carrillo        CSN: [id number]   : 1995  (26 y.o.)  Gender: male   Referring Practitioner: Elaine Conley PA-C  Diagnosis: S69.91XA R2 zone 2 FDS/FDP laceration          General:  OT Visit Information  Onset Date: 18  OT Insurance Information: Medical [place] of [address] [place] [address]; modalities covered; no visit limit  Total # of Visits to Date: 24  Certification Period Expiration Date: 18  Progress Note Counter: PN completed on ; 4/10 for PN  Comments: returns to referring provider on 10/16       Restrictions/Precautions:       Position Activity Restriction  Other position/activity restrictions: Follow [place] Hand Protocol - pg 93; surgery on          Subjective:  Subjective: States that he is not able to tell a difference in his right index finger with the cooler weather. Pain:  Patient Currently in Pain: Denies       Objective:     Upper Extremity Function  UE PROM: PROM to R2 for both isolated and composite flexion  UE Stretching: aggressive scar massage to volar surface of R2  UE Strengthing: right gripping with medium theraputty to work on R2 flexion                                     Electrical Stimulation  Electrical Stimulation Location: Right, Hand  Electrical Stimulation Action: R2 flexion - attempting to get R2 DIP flexion, demonstrated R2 MP and PIP flexion and very little observable DIP flexion  Electrical Stimulation Parameters: 15 minutes at 30 intensity          Activity Tolerance: Additional Comments:  Tolerated treatment well    Assessment:  Assessment: Progressing toward goals slowly    Patient Education:     No new patient education completed this date          Plan:  Plan Comment: Continue per established POC  Specific instructions for Next Treatment:  e-stim, ROM, strengthening                      Ebenezer Palmer, OTR/L #[id number]

## 2018-10-17 ENCOUNTER — HOSPITAL ENCOUNTER (OUTPATIENT)
Dept: OCCUPATIONAL THERAPY | Age: 23
Setting detail: THERAPIES SERIES
Discharge: HOME | End: 2018-10-17
Payer: COMMERCIAL

## 2018-10-17 PROCEDURE — 97110 THERAPEUTIC EXERCISES: CPT

## 2021-03-16 ENCOUNTER — OFFICE VISIT (OUTPATIENT)
Dept: FAMILY MEDICINE CLINIC | Age: 26
End: 2021-03-16
Payer: COMMERCIAL

## 2021-03-16 VITALS
TEMPERATURE: 97.1 F | DIASTOLIC BLOOD PRESSURE: 60 MMHG | SYSTOLIC BLOOD PRESSURE: 108 MMHG | HEART RATE: 64 BPM | OXYGEN SATURATION: 98 % | BODY MASS INDEX: 28.76 KG/M2 | WEIGHT: 224 LBS | RESPIRATION RATE: 14 BRPM

## 2021-03-16 DIAGNOSIS — L25.5 CONTACT DERMATITIS DUE TO PLANTS, EXCEPT FOOD, UNSPECIFIED CONTACT DERMATITIS TYPE: Primary | ICD-10-CM

## 2021-03-16 PROCEDURE — G8419 CALC BMI OUT NRM PARAM NOF/U: HCPCS | Performed by: NURSE PRACTITIONER

## 2021-03-16 PROCEDURE — G8427 DOCREV CUR MEDS BY ELIG CLIN: HCPCS | Performed by: NURSE PRACTITIONER

## 2021-03-16 PROCEDURE — G8484 FLU IMMUNIZE NO ADMIN: HCPCS | Performed by: NURSE PRACTITIONER

## 2021-03-16 PROCEDURE — 1036F TOBACCO NON-USER: CPT | Performed by: NURSE PRACTITIONER

## 2021-03-16 PROCEDURE — 99213 OFFICE O/P EST LOW 20 MIN: CPT | Performed by: NURSE PRACTITIONER

## 2021-03-16 RX ORDER — PREDNISONE 1 MG/1
TABLET ORAL
Qty: 55 TABLET | Refills: 0 | Status: SHIPPED | OUTPATIENT
Start: 2021-03-16 | End: 2021-03-26

## 2021-03-16 ASSESSMENT — ENCOUNTER SYMPTOMS
EYES NEGATIVE: 1
RESPIRATORY NEGATIVE: 1
GASTROINTESTINAL NEGATIVE: 1

## 2021-03-16 NOTE — PROGRESS NOTES
Nadeen Veronica is a 23 y.o. male whopresents today for :  Chief Complaint   Patient presents with   [facility] [place]       HPI:     HPI  Pt works outside. Was in a ditch. Started to break out in poison ivy. Has pretty severe on hands arms neck face and trunk   There is no problem list on file for this patient. History reviewed. No pertinent past medical history. Past Surgical History:   Procedure Laterality Date    TYMPANOSTOMY TUBE PLACEMENT Bilateral      Family History   Problem Relation Age of Onset    Other Paternal Grandmother     Cancer Maternal Grandfather      Social History     Tobacco Use    Smoking status: Never Smoker    Smokeless tobacco: Never Used   Substance Use Topics    Alcohol use: No      Current Outpatient Medications   Medication Sig Dispense Refill    predniSONE (DELTASONE) 5 MG tablet 10 tabs po day 1 taper by 5mg daily 55 tablet 0     No current facility-administered medications for this visit. No Known Allergies  Health Maintenance   Topic Date Due    Hepatitis C screen  Never done    Varicella vaccine (2 of 2 - 2-dose childhood series) 05/30/2001    HPV vaccine (1 - Male 2-dose series) Never done    HIV screen  Never done    Flu vaccine (1) Never done    DTaP/Tdap/Td vaccine (8 - Td) 06/30/2026    Hepatitis B vaccine  Completed    Hib vaccine  Completed    Hepatitis A vaccine  Aged Out    Meningococcal (ACWY) vaccine  Aged Out    Pneumococcal 0-64 years Vaccine  Aged Out       Subjective:     Review of Systems   Constitutional: Negative. HENT: Negative. Eyes: Negative. Respiratory: Negative. Cardiovascular: Negative. Gastrointestinal: Negative. Musculoskeletal: Negative. Skin: Positive for rash. Neurological: Negative.         Objective:     Vitals:    03/16/21 1510   BP: 108/60   Site: Left Upper Arm   Position: Sitting   Cuff Size: Large Adult   Pulse: 64   Resp: 14   Temp: 97.1 °F (36.2 °C)   TempSrc: Temporal   SpO2: 98%   Weight: 224 lb (101.6 kg)       Physical Exam  Constitutional:       Appearance: He is well-developed. HENT:      Head: Normocephalic. Right Ear: Tympanic membrane and external ear normal.      Left Ear: Tympanic membrane and external ear normal.      Nose: Nose normal.   Neck:      Musculoskeletal: Normal range of motion and neck supple. Cardiovascular:      Rate and Rhythm: Normal rate and regular rhythm. Heart sounds: Normal heart sounds. No murmur. No friction rub. No gallop. Pulmonary:      Effort: Pulmonary effort is normal.      Breath sounds: Normal breath sounds. No wheezing or rales. Abdominal:      General: Bowel sounds are normal.      Palpations: Abdomen is soft. Tenderness: There is no abdominal tenderness. There is no guarding. Musculoskeletal: Normal range of motion. Lymphadenopathy:      Cervical: No cervical adenopathy. Skin:     General: Skin is warm. Neurological:      Mental Status: He is alert and oriented to person, place, and time. Deep Tendon Reflexes: Reflexes are normal and symmetric. Assessment:      Diagnosis Orders   1. Contact dermatitis due to plants, except food, unspecified contact dermatitis type         Plan:      No follow-ups on file. No orders of the defined types were placed in this encounter. Orders Placed This Encounter   Medications    predniSONE (DELTASONE) 5 MG tablet     Sig: 10 tabs po day 1 taper by 5mg daily     Dispense:  55 tablet     Refill:  0      See orders  Also advised to use antihistamine    Patient given educational materials - seepatient instructions. Discussed use, benefit, and side effects of prescribed medications. All patient questions answered. Pt voiced understanding. Patient agreed withtreatment plan. Follow up as directed.      Electronically signed by KARISSA PERSAUD CNP on 3/16/2021 at 9:20 PM

## 2021-06-03 NOTE — PROGRESS NOTES
[address] [address]  OUTPATIENT OCCUPATIONAL THERAPY  Daily Note  [facility]    Time In:   Time Out: 3419  Minutes: 25  Timed Code Treatment Minutes: 25 Minutes     Date: 2018  Patient Name: Ivan Garner        CSN: [id number]   : 1995  (26 y.o.)  Gender: male   Referring Practitioner: Rosalina Goodwin PA-C  Diagnosis: S69.91XA R2 zone 2 FDS/FDP laceration          General:  OT Visit Information  Onset Date: 18  OT Insurance Information: Medical [place] of [address] [place] [address]; modalities covered; no visit limit  Total # of Visits to Date: 6  Certification Period Expiration Date: 18  Progress Note Counter: 6/10 for PN  Comments: returns to referring provider on        Restrictions/Precautions:       Position Activity Restriction  Other position/activity restrictions: Follow [place] Hand Protocol - pg 93; surgery on          Subjective:  Subjective: Patient asking about the \"bump\" at the base of his right index finger. Pain:  Patient Currently in Pain: Denies       Objective:     Upper Extremity Function  UE AROM: active R2 flexion - MP flexion x 20 reps while in splint, PIP flexion x 20 reps while in splint, DIP flexion x 20 reps while in splint (poor pull through noted at DIP joint), composite flexion x 20 reps while in splint  UE PROM: PROM to R2 MP, PIP, and DIP joints within limitations of splint  UE Stretching: scar massage to palmar surface of R2 proximal phalanx                                                Activity Tolerance: Additional Comments:  Tolerated treatment well    Assessment:  Assessment: Progressing toward goals    Patient Education:  Patient Education: AROM R2 within limitations of splint: MP, PIP, DIP, and composite flexion; scar massage            Plan:  Plan Comment: Continue per established POC  Specific instructions for Next Treatment: Flexor Tendon Repair Protocol - pg. 93                      RUBEN Mata/BETZAIDA Allergies: Care Instructions  Your Care Instructions     Allergies occur when your body's defense system (immune system) overreacts to certain substances. The immune system treats a harmless substance as if it were a harmful germ or virus. Many things can make this happen. These include pollens, medicine, food, dust, animal dander, and mold. Allergies can be mild or severe. Mild allergies can be managed with home treatment. But medicine may be needed to prevent problems. Managing your allergies is an important part of staying healthy. Your doctor may suggest that you have allergy testing to help find out what is causing your allergies. Severe allergies can cause reactions that affect your whole body (anaphylactic reactions). Your doctor may prescribe a shot of epinephrine to carry with you in case you have a severe reaction. Learn how to give yourself the shot and keep it with you at all times. Make sure it is not . Follow-up care is a key part of your treatment and safety. Be sure to make and go to all appointments, and call your doctor if you are having problems. It's also a good idea to know your test results and keep a list of the medicines you take. How can you care for yourself at home? · If you have been told by your doctor that dust or dust mites are causing your allergy, decrease the dust around your bed:  ? Wash sheets, pillowcases, and other bedding in hot water every week. ? Use dust-proof covers for pillows, duvets, and mattresses. Avoid plastic covers because they tear easily and do not \"breathe. \" Wash as instructed on the label. ? Do not use any blankets and pillows that you do not need. ? Use blankets that you can wash in your washing machine. ? Consider removing drapes and carpets, which attract and hold dust, from your bedroom. · If you are allergic to house dust and mites, do not use home humidifiers. Your doctor can suggest ways you can control dust and mites.   · Look for signs of cockroaches. Cockroaches cause allergic reactions. Use cockroach baits to get rid of them. Then, clean your home well. Cockroaches like areas where grocery bags, newspapers, empty bottles, or cardboard boxes are stored. Do not keep these inside your home, and keep trash and food containers sealed. Seal off any spots where cockroaches might enter your home. · If you are allergic to mold, get rid of furniture, rugs, and drapes that smell musty. Check for mold in the bathroom. · If you are allergic to outdoor pollen or mold spores, use air-conditioning. Change or clean all filters every month. Keep windows closed. · If you are allergic to pollen, stay inside when pollen counts are high. Use a vacuum  with a HEPA filter or a double-thickness filter at least two times each week. · Stay inside when air pollution is bad. Avoid paint fumes, perfumes, and other strong odors. · Avoid conditions that make your allergies worse. Stay away from smoke. Do not smoke or let anyone else smoke in your house. Do not use fireplaces or wood-burning stoves. · If you are allergic to your pets, change the air filter in your furnace every month. Use high-efficiency filters. · If you are allergic to pet dander, keep pets outside or out of your bedroom. Old carpet and cloth furniture can hold a lot of animal dander. You may need to replace them. When should you call for help? Give an epinephrine shot if:    · You think you are having a severe allergic reaction.     · You have symptoms in more than one body area, such as mild nausea and an itchy mouth. After giving an epinephrine shot call 911, even if you feel better. Call 911 if:    · You have symptoms of a severe allergic reaction. These may include:  ? Sudden raised, red areas (hives) all over your body. ? Swelling of the throat, mouth, lips, or tongue. ? Trouble breathing. ? Passing out (losing consciousness).  Or you may feel very lightheaded or suddenly feel weak, confused, or restless.     · You have been given an epinephrine shot, even if you feel better. Call your doctor now or seek immediate medical care if:    · You have symptoms of an allergic reaction, such as:  ? A rash or hives (raised, red areas on the skin). ? Itching. ? Swelling. ? Belly pain, nausea, or vomiting. Watch closely for changes in your health, and be sure to contact your doctor if:    · You do not get better as expected. Where can you learn more? Go to http://www.cook.com/  Enter W171 in the search box to learn more about \"Allergies: Care Instructions. \"  Current as of: November 6, 2020               Content Version: 12.8  © 2006-2021 [employer]. Care instructions adapted under license by [employer] (which disclaims liability or warranty for this information). If you have questions about a medical condition or this instruction, always ask your healthcare professional. [address] [address] any warranty or liability for your use of this information.

## 2022-03-03 ENCOUNTER — HOSPITAL ENCOUNTER (EMERGENCY)
Age: 27
Discharge: HOME | End: 2022-03-03
Attending: EMERGENCY MEDICINE
Payer: COMMERCIAL

## 2022-03-03 VITALS
OXYGEN SATURATION: 98 % | TEMPERATURE: 98.5 F | HEART RATE: 56 BPM | SYSTOLIC BLOOD PRESSURE: 122 MMHG | DIASTOLIC BLOOD PRESSURE: 56 MMHG | WEIGHT: 212 LBS | RESPIRATION RATE: 16 BRPM | HEIGHT: 75 IN | BODY MASS INDEX: 26.36 KG/M2

## 2022-03-03 DIAGNOSIS — S61.210A LACERATION OF RIGHT INDEX FINGER WITHOUT FOREIGN BODY WITHOUT DAMAGE TO NAIL, INITIAL ENCOUNTER: Primary | ICD-10-CM

## 2022-03-03 PROCEDURE — 99283 EMERGENCY DEPT VISIT LOW MDM: CPT

## 2022-03-03 PROCEDURE — 12001 RPR S/N/AX/GEN/TRNK 2.5CM/<: CPT

## 2022-03-03 PROCEDURE — 6370000000 HC RX 637 (ALT 250 FOR IP): Performed by: EMERGENCY MEDICINE

## 2022-03-03 PROCEDURE — 2500000003 HC RX 250 WO HCPCS

## 2022-03-03 RX ORDER — CEPHALEXIN 500 MG/1
500 CAPSULE ORAL ONCE
Status: COMPLETED | OUTPATIENT
Start: 2022-03-03 | End: 2022-03-03

## 2022-03-03 RX ORDER — CEPHALEXIN 500 MG/1
500 CAPSULE ORAL 3 TIMES DAILY
Qty: 21 CAPSULE | Refills: 0 | Status: SHIPPED | OUTPATIENT
Start: 2022-03-03 | End: 2022-03-10

## 2022-03-03 RX ORDER — LIDOCAINE HYDROCHLORIDE 10 MG/ML
INJECTION, SOLUTION INFILTRATION; PERINEURAL
Status: COMPLETED
Start: 2022-03-03 | End: 2022-03-03

## 2022-03-03 RX ADMIN — LIDOCAINE HYDROCHLORIDE 200 MG: 10 INJECTION, SOLUTION INFILTRATION; PERINEURAL at 19:05

## 2022-03-03 RX ADMIN — CEPHALEXIN 500 MG: 500 CAPSULE ORAL at 19:47

## 2022-03-03 ASSESSMENT — PAIN DESCRIPTION - ORIENTATION: ORIENTATION: RIGHT

## 2022-03-03 ASSESSMENT — PAIN DESCRIPTION - PAIN TYPE: TYPE: ACUTE PAIN

## 2022-03-03 ASSESSMENT — PAIN DESCRIPTION - DESCRIPTORS: DESCRIPTORS: THROBBING

## 2022-03-03 ASSESSMENT — PAIN SCALES - GENERAL: PAINLEVEL_OUTOF10: 7

## 2022-03-03 ASSESSMENT — PAIN DESCRIPTION - LOCATION: LOCATION: FINGER (COMMENT WHICH ONE)

## 2022-03-04 NOTE — ED NOTES
Med administered. Education complete. Discharge teaching and instructions for condition explained to patient. AVS reviewed. Went over prescriptions with patient. Patient voiced understanding regarding prescriptions, follow up appointments and care of self at home. Pt discharged to home in stable condition per self.        Amy Munroe RN  03/03/22 [id number]

## 2022-03-04 NOTE — ED NOTES
Telfa and tube gauze applied to right index wound and finger. Wound care explained.       Mamadou Borrero RN  03/03/22 1946

## 2022-03-04 NOTE — ED NOTES
Wound continues to ooze blood, pressure held and hand elevated.       Lili Anderson RN  03/03/22 1945

## 2022-03-04 NOTE — ED PROVIDER NOTES
[address] [facility] [address]  [address] [facility] [address]  Phone: [phone]    eMERGENCY dEPARTMENT eNCOUnter           [address] [facility]       Chief Complaint   Patient presents with    Laceration     right index finger laceration with a drill       Nurses Notes reviewed and I agree except as noted in the HPI. HISTORY OF PRESENT ILLNESS    Bisi Dominguez is a 27 y.o. male who presented via private vehicle with above-mentioned complaint. He injured his right index finger with a drill while he was working on his truck at home prior to arrival.  He is complaining of mild, sharp, intermittent finger pain which is worse with movement. He denies focal weakness or numbness. REVIEW OF SYSTEMS     None except as mentioned above    PAST MEDICAL HISTORY    has no past medical history on file. SURGICAL HISTORY      has a past surgical history that includes Tympanostomy tube placement (Bilateral) and Finger surgery (Right). CURRENT MEDICATIONS       Discharge Medication List as of 3/3/2022  7:44 PM          ALLERGIES     has No Known Allergies. FAMILY HISTORY     He indicated that his mother is alive. He indicated that his father is alive. He indicated that the status of his maternal grandfather is unknown. He indicated that his paternal grandmother is . family history includes Cancer in his maternal grandfather; Other in his paternal grandmother. SOCIAL HISTORY      reports that he has never smoked. He has never used smokeless tobacco. He reports current alcohol use. He reports that he does not use drugs. PHYSICAL EXAM     INITIAL VITALS:  height is 6' 3\" (1.905 m) and weight is 212 lb (96.2 kg). His temporal temperature is 98.5 °F (36.9 °C). His blood pressure is 122/56 (abnormal) and his pulse is 56. His respiration is 16 and oxygen saturation is 98%. Physical Exam  Constitutional:       General: He is not in acute distress. Appearance: He is not ill-appearing. HENT:      Head: Atraumatic. Musculoskeletal:      Comments: Examination of the right index finger showed 2 cm laceration across the middle part of the extensor aspect of the second phalanx. He has normal flexion and extension of all phalanxes of the involved finger. He also has normal sensory examination and perfusion of that finger. Neurological:      Mental Status: He is alert. DIFFERENTIAL DIAGNOSIS:       DIAGNOSTIC RESULTS         LABS:   Labs Reviewed - No data to display    EMERGENCY DEPARTMENT COURSE:   Vitals:    Vitals:    03/03/22 1757   BP: (!) 122/56   Pulse: 56   Resp: 16   Temp: 98.5 °F (36.9 °C)   TempSrc: Temporal   SpO2: 98%   Weight: 212 lb (96.2 kg)   Height: 6' 3\" (1.905 m)         PROCEDURES:  Right index finger laceration repair:  Topical anesthesia was achieved with 4 mL of 1% lidocaine without epinephrine. I explored the wound. There is no foreign body. Extensor tendons visible but not injured. I irrigated profusely with saline and Betadine. I debrided the wound edges. I repaired the wound was 4 interrupted stitches of 4.0 nylon. Complications were none. Finger splint was applied. FINAL IMPRESSION      1. Laceration of right index finger without foreign body without damage to nail, initial encounter          DISPOSITION/PLAN   Discharged home in good condition.     PATIENT REFERRED TO:  KARISSA Carrero - CNP  [address] [place] [address] 2  [address] [address]  [phone]    In 4 days  For wound re-check      DISCHARGE MEDICATIONS:  Discharge Medication List as of 3/3/2022  7:44 PM      START taking these medications    Details   cephALEXin (KEFLEX) 500 MG capsule Take 1 capsule by mouth 3 times daily for 7 days, Disp-21 capsule, R-0Normal             (Please note that portions of this note were completed with a voice recognition program.  Efforts were made to edit the dictations but occasionally words are mis-transcribed.)    Monik Malin, [address] [facility], [place]  03/03/22 6505

## 2022-03-04 NOTE — ED NOTES
Pt pink, warm and dry, breathing with ease. Prescription explained, pt states understanding. AVS reviewed including follow up appointments, diagnosis, and care of self at home. Denies questions or concerns. Pt remains alert and oriented. Pt discharged in stable condition.       Jerri Saldaña RN  03/03/22 1958

## 2022-03-08 ENCOUNTER — OFFICE VISIT (OUTPATIENT)
Dept: FAMILY MEDICINE CLINIC | Age: 27
End: 2022-03-08
Payer: COMMERCIAL

## 2022-03-08 VITALS
DIASTOLIC BLOOD PRESSURE: 58 MMHG | TEMPERATURE: 97.1 F | BODY MASS INDEX: 28.47 KG/M2 | HEIGHT: 75 IN | WEIGHT: 229 LBS | HEART RATE: 52 BPM | SYSTOLIC BLOOD PRESSURE: 120 MMHG | RESPIRATION RATE: 14 BRPM | OXYGEN SATURATION: 97 %

## 2022-03-08 DIAGNOSIS — S61.210A LACERATION OF RIGHT INDEX FINGER, FOREIGN BODY PRESENCE UNSPECIFIED, NAIL DAMAGE STATUS UNSPECIFIED, INITIAL ENCOUNTER: Primary | ICD-10-CM

## 2022-03-08 PROCEDURE — G8484 FLU IMMUNIZE NO ADMIN: HCPCS | Performed by: NURSE PRACTITIONER

## 2022-03-08 PROCEDURE — G8419 CALC BMI OUT NRM PARAM NOF/U: HCPCS | Performed by: NURSE PRACTITIONER

## 2022-03-08 PROCEDURE — G8427 DOCREV CUR MEDS BY ELIG CLIN: HCPCS | Performed by: NURSE PRACTITIONER

## 2022-03-08 PROCEDURE — 1036F TOBACCO NON-USER: CPT | Performed by: NURSE PRACTITIONER

## 2022-03-08 PROCEDURE — 99213 OFFICE O/P EST LOW 20 MIN: CPT | Performed by: NURSE PRACTITIONER

## 2022-03-08 SDOH — ECONOMIC STABILITY: FOOD INSECURITY: WITHIN THE PAST 12 MONTHS, THE FOOD YOU BOUGHT JUST DIDN'T LAST AND YOU DIDN'T HAVE MONEY TO GET MORE.: NEVER TRUE

## 2022-03-08 SDOH — ECONOMIC STABILITY: FOOD INSECURITY: WITHIN THE PAST 12 MONTHS, YOU WORRIED THAT YOUR FOOD WOULD RUN OUT BEFORE YOU GOT MONEY TO BUY MORE.: NEVER TRUE

## 2022-03-08 ASSESSMENT — ENCOUNTER SYMPTOMS
RESPIRATORY NEGATIVE: 1
GASTROINTESTINAL NEGATIVE: 1
EYES NEGATIVE: 1

## 2022-03-08 ASSESSMENT — PATIENT HEALTH QUESTIONNAIRE - PHQ9
1. LITTLE INTEREST OR PLEASURE IN DOING THINGS: 0
SUM OF ALL RESPONSES TO PHQ QUESTIONS 1-9: 0
SUM OF ALL RESPONSES TO PHQ QUESTIONS 1-9: 0
SUM OF ALL RESPONSES TO PHQ9 QUESTIONS 1 & 2: 0
SUM OF ALL RESPONSES TO PHQ QUESTIONS 1-9: 0
SUM OF ALL RESPONSES TO PHQ QUESTIONS 1-9: 0
2. FEELING DOWN, DEPRESSED OR HOPELESS: 0

## 2022-03-08 ASSESSMENT — SOCIAL DETERMINANTS OF HEALTH (SDOH): HOW HARD IS IT FOR YOU TO PAY FOR THE VERY BASICS LIKE FOOD, HOUSING, MEDICAL CARE, AND HEATING?: NOT HARD AT ALL

## 2022-03-08 NOTE — PROGRESS NOTES
Mathew Jerez is a 27 y.o. male whopresents today for :  Chief Complaint   Patient presents with    Other     ED Follow Up       HPI:     HPI  Pt here for fu of ER for laceration of finger  Reports been trying to keep clean. No pain     There is no problem list on file for this patient. No past medical history on file. Past Surgical History:   Procedure Laterality Date    FINGER SURGERY Right     tendon repair    TYMPANOSTOMY TUBE PLACEMENT Bilateral      Family History   Problem Relation Age of Onset    Other Paternal Grandmother     Cancer Maternal Grandfather      Social History     Tobacco Use    Smoking status: Never Smoker    Smokeless tobacco: Never Used   Substance Use Topics    Alcohol use: Yes     Comment: socially      Current Outpatient Medications   Medication Sig Dispense Refill    cephALEXin (KEFLEX) 500 MG capsule Take 1 capsule by mouth 3 times daily for 7 days 21 capsule 0     No current facility-administered medications for this visit. No Known Allergies  Health Maintenance   Topic Date Due    Hepatitis C screen  Never done    Varicella vaccine (2 of 2 - 2-dose childhood series) 05/30/2001    COVID-19 Vaccine (1) Never done    HPV vaccine (1 - Male 2-dose series) Never done    Depression Screen  Never done    HIV screen  Never done    Flu vaccine (1) Never done    DTaP/Tdap/Td vaccine (8 - Td or Tdap) 06/30/2026    Hepatitis B vaccine  Completed    Hib vaccine  Completed    Hepatitis A vaccine  Aged Out    Meningococcal (ACWY) vaccine  Aged Out    Pneumococcal 0-64 years Vaccine  Aged Out       Subjective:     Review of Systems   Constitutional: Negative. HENT: Negative. Eyes: Negative. Respiratory: Negative. Cardiovascular: Negative. Gastrointestinal: Negative. Musculoskeletal: Negative. Skin: Positive for wound. Neurological: Negative.         Objective:     Vitals:    03/08/22 1505   BP: (!) 120/58   Site: Left Upper Arm   Position: Sitting   Cuff Size: Medium Adult   Pulse: 52   Resp: 14   Temp: 97.1 °F (36.2 °C)   SpO2: 97%   Weight: 229 lb (103.9 kg)   Height: 6' 3\" (1.905 m)       Physical Exam  Constitutional:       Appearance: He is well-developed. HENT:      Head: Normocephalic. Right Ear: Tympanic membrane and external ear normal.      Left Ear: Tympanic membrane and external ear normal.      Nose: Nose normal.   Cardiovascular:      Rate and Rhythm: Normal rate and regular rhythm. Heart sounds: Normal heart sounds. No murmur heard. No friction rub. No gallop. Pulmonary:      Effort: Pulmonary effort is normal.      Breath sounds: Normal breath sounds. No wheezing or rales. Abdominal:      General: Bowel sounds are normal.      Palpations: Abdomen is soft. Tenderness: There is no abdominal tenderness. There is no guarding. Musculoskeletal:         General: Normal range of motion. Cervical back: Normal range of motion and neck supple. Lymphadenopathy:      Cervical: No cervical adenopathy. Skin:     General: Skin is warm. Neurological:      Mental Status: He is alert and oriented to person, place, and time. Deep Tendon Reflexes: Reflexes are normal and symmetric. Assessment:      Diagnosis Orders   1. Laceration of right index finger, foreign body presence unspecified, nail damage status unspecified, initial encounter         Plan:      No follow-ups on file. No orders of the defined types were placed in this encounter. No orders of the defined types were placed in this encounter. Monitor for infection. Discussed wound care  Advised to call back directly if there are further questions, or if these symptoms fail to improve as anticipated or worsen. Patient given educational materials - seepatient instructions. Discussed use, benefit, and side effects of prescribed medications. All patient questions answered. Pt voiced understanding.   Patient agreed withtreatment plan. Follow up as directed.      Electronically signed by KARISSA Potter CNP on 3/8/2022 at 5:35 PM

## 2022-04-06 NOTE — PROGRESS NOTES
[address] [address]  OUTPATIENT OCCUPATIONAL THERAPY  Daily Note  [facility]    Time In: 2378  Time Out: 1155  Minutes: 25  Timed Code Treatment Minutes: 25 Minutes     Date: 2018  Patient Name: Lawrence Campbell        CSN: [id number]   : 1995  (26 y.o.)  Gender: male   Referring Practitioner: Aislinn Frazier PA-C  Diagnosis: S69.91XA R2 zone 2 FDS/FDP laceration          General:  OT Visit Information  Onset Date: 18  OT Insurance Information: [employer] [place] of [address] [place] [address]; modalities covered; no visit limit  Total # of Visits to Date: 3  Certification Period Expiration Date: 18  Progress Note Counter: 3/10 for PN  Comments: returns to referring provider on        Restrictions/Precautions:       Position Activity Restriction  Other position/activity restrictions: Follow [place] Hand Protocol - pg 93; surgery on          Subjective:  Subjective: States that the only time that he has any pain in his right hand is when he has his hand down at his side. Pain:  Patient Currently in Pain: Denies       Objective:     Upper Extremity Function  UE PROM: PROM to R2 MP, PIP, and DIP within limitations of splint. Worked on both isolated flexion and composite flexion of R2                                                Activity Tolerance: Additional Comments:  Tolerated treatment well    Assessment:  Assessment: Progressing toward goals    Patient Education:  Patient Education: to change dressing daily            Plan:  Plan Comment: Continue per established POC  Specific instructions for Next Treatment: Flexor Tendon Repair Protocol - pg. 80                      Blanca Perez OTR/L #[id number] Benefits, risks, and possible complications of procedure explained to patient/caregiver who verbalized understanding and gave written consent.